# Patient Record
Sex: FEMALE | Race: AMERICAN INDIAN OR ALASKA NATIVE
[De-identification: names, ages, dates, MRNs, and addresses within clinical notes are randomized per-mention and may not be internally consistent; named-entity substitution may affect disease eponyms.]

---

## 2018-04-13 ENCOUNTER — HOSPITAL ENCOUNTER (INPATIENT)
Dept: HOSPITAL 5 - ED | Age: 48
LOS: 4 days | Discharge: HOME | DRG: 557 | End: 2018-04-17
Attending: INTERNAL MEDICINE | Admitting: INTERNAL MEDICINE
Payer: SELF-PAY

## 2018-04-13 DIAGNOSIS — F29: ICD-10-CM

## 2018-04-13 DIAGNOSIS — R41.82: ICD-10-CM

## 2018-04-13 DIAGNOSIS — E16.2: ICD-10-CM

## 2018-04-13 DIAGNOSIS — M62.82: Primary | ICD-10-CM

## 2018-04-13 DIAGNOSIS — G93.40: ICD-10-CM

## 2018-04-13 DIAGNOSIS — F20.9: ICD-10-CM

## 2018-04-13 LAB
ALBUMIN SERPL-MCNC: 4.2 G/DL (ref 3.9–5)
ALT SERPL-CCNC: 19 UNITS/L (ref 7–56)
APTT BLD: 33.2 SEC. (ref 24.2–36.6)
BASOPHILS # (AUTO): 0 K/MM3 (ref 0–0.1)
BASOPHILS NFR BLD AUTO: 0.6 % (ref 0–1.8)
BENZODIAZEPINES SCREEN,URINE: (no result)
BILIRUB UR QL STRIP: (no result)
BLOOD UR QL VISUAL: (no result)
BUN SERPL-MCNC: 7 MG/DL (ref 7–17)
BUN/CREAT SERPL: 18 %
CALCIUM SERPL-MCNC: 9.9 MG/DL (ref 8.4–10.2)
EOSINOPHIL # BLD AUTO: 0.2 K/MM3 (ref 0–0.4)
EOSINOPHIL NFR BLD AUTO: 3 % (ref 0–4.3)
HCT VFR BLD CALC: 41.1 % (ref 30.3–42.9)
HEMOLYSIS INDEX: 22
HGB BLD-MCNC: 13.7 GM/DL (ref 10.1–14.3)
INR PPP: 0.96 (ref 0.87–1.13)
LYMPHOCYTES # BLD AUTO: 1.7 K/MM3 (ref 1.2–5.4)
LYMPHOCYTES NFR BLD AUTO: 25.7 % (ref 13.4–35)
MCH RBC QN AUTO: 32 PG (ref 28–32)
MCHC RBC AUTO-ENTMCNC: 33 % (ref 30–34)
MCV RBC AUTO: 97 FL (ref 79–97)
METHADONE SCREEN,URINE: (no result)
MONOCYTES # (AUTO): 0.5 K/MM3 (ref 0–0.8)
MONOCYTES % (AUTO): 8.2 % (ref 0–7.3)
MUCOUS THREADS #/AREA URNS HPF: (no result) /HPF
OPIATE SCREEN,URINE: (no result)
PH UR STRIP: 5 [PH] (ref 5–7)
PLATELET # BLD: 267 K/MM3 (ref 140–440)
PROT UR STRIP-MCNC: (no result) MG/DL
RBC # BLD AUTO: 4.25 M/MM3 (ref 3.65–5.03)
RBC #/AREA URNS HPF: 2 /HPF (ref 0–6)
T4 FREE SERPL-MCNC: 1.15 NG/DL (ref 0.76–1.46)
UROBILINOGEN UR-MCNC: < 2 MG/DL (ref ?–2)
WBC #/AREA URNS HPF: 3 /HPF (ref 0–6)

## 2018-04-13 PROCEDURE — 82553 CREATINE MB FRACTION: CPT

## 2018-04-13 PROCEDURE — 96375 TX/PRO/DX INJ NEW DRUG ADDON: CPT

## 2018-04-13 PROCEDURE — 84439 ASSAY OF FREE THYROXINE: CPT

## 2018-04-13 PROCEDURE — 93005 ELECTROCARDIOGRAM TRACING: CPT

## 2018-04-13 PROCEDURE — 93010 ELECTROCARDIOGRAM REPORT: CPT

## 2018-04-13 PROCEDURE — 96372 THER/PROPH/DIAG INJ SC/IM: CPT

## 2018-04-13 PROCEDURE — 81001 URINALYSIS AUTO W/SCOPE: CPT

## 2018-04-13 PROCEDURE — G0480 DRUG TEST DEF 1-7 CLASSES: HCPCS

## 2018-04-13 PROCEDURE — 80320 DRUG SCREEN QUANTALCOHOLS: CPT

## 2018-04-13 PROCEDURE — 83036 HEMOGLOBIN GLYCOSYLATED A1C: CPT

## 2018-04-13 PROCEDURE — 70450 CT HEAD/BRAIN W/O DYE: CPT

## 2018-04-13 PROCEDURE — 80307 DRUG TEST PRSMV CHEM ANLYZR: CPT

## 2018-04-13 PROCEDURE — 84443 ASSAY THYROID STIM HORMONE: CPT

## 2018-04-13 PROCEDURE — 85730 THROMBOPLASTIN TIME PARTIAL: CPT

## 2018-04-13 PROCEDURE — 82140 ASSAY OF AMMONIA: CPT

## 2018-04-13 PROCEDURE — 85025 COMPLETE CBC W/AUTO DIFF WBC: CPT

## 2018-04-13 PROCEDURE — 96374 THER/PROPH/DIAG INJ IV PUSH: CPT

## 2018-04-13 PROCEDURE — 83880 ASSAY OF NATRIURETIC PEPTIDE: CPT

## 2018-04-13 PROCEDURE — 84484 ASSAY OF TROPONIN QUANT: CPT

## 2018-04-13 PROCEDURE — 85610 PROTHROMBIN TIME: CPT

## 2018-04-13 PROCEDURE — 80053 COMPREHEN METABOLIC PANEL: CPT

## 2018-04-13 PROCEDURE — 82550 ASSAY OF CK (CPK): CPT

## 2018-04-13 PROCEDURE — 36415 COLL VENOUS BLD VENIPUNCTURE: CPT

## 2018-04-13 RX ADMIN — FAMOTIDINE SCH MG: 10 INJECTION, SOLUTION INTRAVENOUS at 23:34

## 2018-04-13 NOTE — CAT SCAN REPORT
CT HEAD WITHOUT CONTRAST



INDICATION: Altered mental status.



COMPARISON: None similar at this institution.



FINDINGS: Noncontrast head CT demonstrates normal, symmetric ventricles 

and sulci without acute or recent infarct, hemorrhage, mass effect or 

midline shift. No abnormal extra-axial fluid collections. Posterior 

fossa structures and basilar cisterns appear within normal limits.



Symmetric eye globes. Clear paranasal sinuses and mastoid air cells. 

Intact calvarium. Normal overlying scalp soft tissues. Few radiopaque 

dental material incidentally noted.



CONCLUSION: No acute intracranial CT abnormality, as described.



Thank you for the opportunity to participate in this patient's care.

## 2018-04-13 NOTE — EMERGENCY DEPARTMENT REPORT
HPI





- General


Chief Complaint: Altered Mental Status


Time Seen by Provider: 18 10:18





- HPI


HPI: 





Room 24





The patient is a 48-year-old female brought in by EMS with chief complaint of 

altered mental status.  Nursing reports EMS was called to the [facility] for 

altered mental status.  There is no EMS run sheet currently available for the 

patient reportedly had nonsensical speech.  The patient has nonsensical speech.

  ED does not respond appropriate questions.  When asked why she was brought to 

the emergency department the patient replied "I don't like meat."  And "I've 

been standing still for a million years."  Per EMS the patient was found to 

have an Accu-Chek in the 50s.  The patient was given juice to drink prior to my 

interview





Location: Mental state


Duration: Unknown


Quality: Altered


Severity: Moderate


Modifying factors: [see above]


Context: [see above]


Mode of transportation: [not driving]











ED Past Medical Hx





- Past Medical History


Additional medical history: Unknown





- Surgical History


Additional Surgical History: Unknown





- Family History


Family history: no significant





- Social History


Smoking Status: Unknown if ever smoked





ED Review of Systems


ROS: 


Stated complaint: AMS


Other details as noted in HPI





Comment: Unobtainable due to pts medical conditions





Physical Exam





- Physical Exam


Vital Signs: 


 Vital Signs











  18





  10:13


 


Temperature 97.6 F


 


Pulse Rate 94 H


 


Respiratory 18





Rate 


 


Blood Pressure 113/70


 


Blood Pressure 113/70





[Right] 


 


O2 Sat by Pulse 100





Oximetry 











Physical Exam: 





GENERAL: The patient is well-developed well-nourished female lying on stretcher 

not appearing to be in acute distress. []


HEENT: Normocephalic.  Atraumatic. 


NECK: Trachea midline


CHEST/LUNGS:There is no respiratory distress noted.


HEART/CARDIOVASCULAR: Regular.  There is no tachycardia.  There is no gallop 

rub or murmur.


ABDOMEN:  There is no abdominal distention.


SKIN: There is no rash.  There is no edema.  There is no diaphoresis.


NEURO: The patient is awake, alert, but does not appear oriented.  The patient 

is not cooperative. The patient has normal speech


MUSCULOSKELETAL:  There is no evidence of acute injury.





ED Course


 Vital Signs











  18





  10:13


 


Temperature 97.6 F


 


Pulse Rate 94 H


 


Respiratory 18





Rate 


 


Blood Pressure 113/70


 


Blood Pressure 113/70





[Right] 


 


O2 Sat by Pulse 100





Oximetry 














ED Medical Decision Making





- Lab Data


Result diagrams: 


 18 12:26





 18 12:26





 Laboratory Tests











  18





  12:26 12:26 12:26


 


WBC  6.6  


 


RBC  4.25  


 


Hgb  13.7  


 


Hct  41.1  


 


MCV  97  


 


MCH  32  


 


MCHC  33  


 


RDW  14.9  


 


Plt Count  267  


 


Lymph % (Auto)  25.7  


 


Mono % (Auto)  8.2 H  


 


Eos % (Auto)  3.0  


 


Baso % (Auto)  0.6  


 


Lymph #  1.7  


 


Mono #  0.5  


 


Eos #  0.2  


 


Baso #  0.0  


 


Seg Neutrophils %  62.5  


 


Seg Neutrophils #  4.1  


 


PT   13.3 


 


INR   0.96 


 


APTT   33.2 


 


Sodium    139


 


Potassium    3.8


 


Chloride    99.4


 


Carbon Dioxide    23


 


Anion Gap    20


 


BUN    7


 


Creatinine    0.4 L


 


Estimated GFR    > 60


 


BUN/Creatinine Ratio    18


 


Glucose    58 L


 


Calcium    9.9


 


Total Bilirubin    0.40


 


AST    48 H


 


ALT    19


 


Alkaline Phosphatase    66


 


Ammonia   


 


Total Creatine Kinase    2482 H


 


CK-MB (CK-2)    23.5 H


 


CK-MB (CK-2) Rel Index    0.9


 


Troponin T    < 0.010


 


NT-Pro-B Natriuret Pep    31.77


 


Total Protein    7.7


 


Albumin    4.2


 


Albumin/Globulin Ratio    1.2


 


TSH   


 


Free T4   


 


Urine Color   


 


Urine Turbidity   


 


Urine pH   


 


Ur Specific Gravity   


 


Urine Protein   


 


Urine Glucose (UA)   


 


Urine Ketones   


 


Urine Blood   


 


Urine Nitrite   


 


Urine Bilirubin   


 


Urine Urobilinogen   


 


Ur Leukocyte Esterase   


 


Urine WBC (Auto)   


 


Urine RBC (Auto)   


 


Uric Acid Crystals   


 


Urine Mucus   


 


Urine Opiates Screen   


 


Urine Methadone Screen   


 


Ur Barbiturates Screen   


 


Ur Phencyclidine Scrn   


 


Ur Amphetamines Screen   


 


U Benzodiazepines Scrn   


 


Urine Cocaine Screen   


 


U Marijuana (THC) Screen   


 


Drugs of Abuse Note   


 


Plasma/Serum Alcohol   














  18





  12:26 12:26 12:26


 


WBC   


 


RBC   


 


Hgb   


 


Hct   


 


MCV   


 


MCH   


 


MCHC   


 


RDW   


 


Plt Count   


 


Lymph % (Auto)   


 


Mono % (Auto)   


 


Eos % (Auto)   


 


Baso % (Auto)   


 


Lymph #   


 


Mono #   


 


Eos #   


 


Baso #   


 


Seg Neutrophils %   


 


Seg Neutrophils #   


 


PT   


 


INR   


 


APTT   


 


Sodium   


 


Potassium   


 


Chloride   


 


Carbon Dioxide   


 


Anion Gap   


 


BUN   


 


Creatinine   


 


Estimated GFR   


 


BUN/Creatinine Ratio   


 


Glucose   


 


Calcium   


 


Total Bilirubin   


 


AST   


 


ALT   


 


Alkaline Phosphatase   


 


Ammonia  48.0  


 


Total Creatine Kinase   


 


CK-MB (CK-2)   


 


CK-MB (CK-2) Rel Index   


 


Troponin T   


 


NT-Pro-B Natriuret Pep   


 


Total Protein   


 


Albumin   


 


Albumin/Globulin Ratio   


 


TSH    0.700


 


Free T4    1.15


 


Urine Color   


 


Urine Turbidity   


 


Urine pH   


 


Ur Specific Gravity   


 


Urine Protein   


 


Urine Glucose (UA)   


 


Urine Ketones   


 


Urine Blood   


 


Urine Nitrite   


 


Urine Bilirubin   


 


Urine Urobilinogen   


 


Ur Leukocyte Esterase   


 


Urine WBC (Auto)   


 


Urine RBC (Auto)   


 


Uric Acid Crystals   


 


Urine Mucus   


 


Urine Opiates Screen   


 


Urine Methadone Screen   


 


Ur Barbiturates Screen   


 


Ur Phencyclidine Scrn   


 


Ur Amphetamines Screen   


 


U Benzodiazepines Scrn   


 


Urine Cocaine Screen   


 


U Marijuana (THC) Screen   


 


Drugs of Abuse Note   


 


Plasma/Serum Alcohol   < 0.01 














  18





  Unknown Unknown


 


WBC  


 


RBC  


 


Hgb  


 


Hct  


 


MCV  


 


MCH  


 


MCHC  


 


RDW  


 


Plt Count  


 


Lymph % (Auto)  


 


Mono % (Auto)  


 


Eos % (Auto)  


 


Baso % (Auto)  


 


Lymph #  


 


Mono #  


 


Eos #  


 


Baso #  


 


Seg Neutrophils %  


 


Seg Neutrophils #  


 


PT  


 


INR  


 


APTT  


 


Sodium  


 


Potassium  


 


Chloride  


 


Carbon Dioxide  


 


Anion Gap  


 


BUN  


 


Creatinine  


 


Estimated GFR  


 


BUN/Creatinine Ratio  


 


Glucose  


 


Calcium  


 


Total Bilirubin  


 


AST  


 


ALT  


 


Alkaline Phosphatase  


 


Ammonia  


 


Total Creatine Kinase  


 


CK-MB (CK-2)  


 


CK-MB (CK-2) Rel Index  


 


Troponin T  


 


NT-Pro-B Natriuret Pep  


 


Total Protein  


 


Albumin  


 


Albumin/Globulin Ratio  


 


TSH  


 


Free T4  


 


Urine Color  Yellow 


 


Urine Turbidity  Clear 


 


Urine pH  5.0 


 


Ur Specific Gravity  1.010 


 


Urine Protein  <15 mg/dl 


 


Urine Glucose (UA)  Neg 


 


Urine Ketones  Neg 


 


Urine Blood  Sm 


 


Urine Nitrite  Neg 


 


Urine Bilirubin  Neg 


 


Urine Urobilinogen  < 2.0 


 


Ur Leukocyte Esterase  Neg 


 


Urine WBC (Auto)  3.0 


 


Urine RBC (Auto)  2.0 


 


Uric Acid Crystals  1+ 


 


Urine Mucus  Few 


 


Urine Opiates Screen   Presumptive negative


 


Urine Methadone Screen   Presumptive negative


 


Ur Barbiturates Screen   Presumptive negative


 


Ur Phencyclidine Scrn   Presumptive negative


 


Ur Amphetamines Screen   Presumptive negative


 


U Benzodiazepines Scrn   Presumptive negative


 


Urine Cocaine Screen   Presumptive negative


 


U Marijuana (THC) Screen   Presumptive negative


 


Drugs of Abuse Note   Disclamer


 


Plasma/Serum Alcohol  














- EKG Data


-: EKG Interpreted by Me


EKG shows normal: sinus rhythm


Rate: normal





- EKG Data


When compared to previous EKG there are: previous EKG unavailable


Interpretation: nonspecific ST-T wave melceio (flattened T waves diffusely)





- Radiology Data


Radiology results: report reviewed (CT head), image reviewed (CT head)





[facility] 


[address] [place] [address] 





Cat Scan Report 


Signed 





Patient: SELMA URRUTIA MR#: [id number] 


: 1970 Acct:[id number] 


Age/Sex: 48 / F ADM Date: 18 


Loc: ED 


Attending Dr: 








Ordering Physician: TOO MACEDO MD 


Date of Service: 18 


Procedure(s): CT head/brain wo con 


Accession Number(s): [id number] 





cc: TOO MACEDO MD 








CT HEAD WITHOUT CONTRAST 





INDICATION: Altered mental status. 





COMPARISON: None similar at this institution. 





FINDINGS: Noncontrast head CT demonstrates normal, symmetric ventricles 


and sulci without acute or recent infarct, hemorrhage, mass effect or 


midline shift. No abnormal extra-axial fluid collections. Posterior 


fossa structures and basilar cisterns appear within normal limits. 





Symmetric eye globes. Clear paranasal sinuses and mastoid air cells. 


Intact calvarium. Normal overlying scalp soft tissues. Few radiopaque 


dental material incidentally noted. 





CONCLUSION: No acute intracranial CT abnormality, as described. 





Thank you for the opportunity to participate in this patient's care. 





Transcribed By: RS 


Dictated By: GAL STEVE MD 


Electronically Authenticated By: GAL STEVE MD 


Signed Date/Time: 18 113 











DD/DT: 18 113 


TD/TT: 18 113





- Differential Diagnosis


psychosis, CVA, ICH, altered mental status, substance abuse


Critical care attestation.: 


If time is entered above; I have spent that time in minutes in the direct care 

of this critically ill patient, excluding procedure time.








ED Disposition


Clinical Impression: 


 Altered mental status, Rhabdomyolysis





Disposition:  OP ADMIT IP TO THIS HOSP


Is pt being admited?: Yes


Does the pt Need Aspirin: Yes


Condition: Fair


Referrals: 


SARAH HOFFMAN MD [Primary Care Provider] - 3-5 Days


Time of Disposition: 13:35 (hospitalist notified (Dr Kirkland))

## 2018-04-13 NOTE — HISTORY AND PHYSICAL REPORT
History of Present Illness


Date of examination: 04/13/18


Date of admission: 


04/13/18 13:48





Chief complaint: 


 Chief complaint: 


Altered sensorium-acute onset-duration not known





History of present illness: 


JENNIFER:


48-year-old -American female brought in by EMS from [facility] for 

severely altered mental status.  Patient reportedly had nonsensical speech.  In 

the emergency room patient was not responding to any questions properly.  When 

asked why she was brought to the emergency room the patient replied "I don't 

like meat".  " And I have been standing still for 1 million years".  Patient 

apparently had a Accu-Cheks in the range of 50s.  Patient was given apple 

juice.  Acute onset of confusion

















-Past Medical History


Additional medical history: Unknown





- Surgical History


Additional Surgical History: Unknown





- Family History


Family history: no significant





- Social History


Smoking Status: Unknown if ever smoked





Review of Systems


ROS: 


Stated complaint: AMS


Other details as noted in HPI





Comment: Unobtainable due to pts medical conditions


14 point review of systems was attempted but could not be done because of the 

patient's condition








Medications and Allergies


 Allergies











Allergy/AdvReac Type Severity Reaction Status Date / Time


 


No Known Allergies Allergy   Unverified 04/13/18 10:23











Active Meds: 


Active Medications





Diphenhydramine HCl (Benadryl)  50 mg IM Q6H PRN


   PRN Reason: Agitation


   Last Admin: 04/13/18 11:02 Dose:  50 mg


Haloperidol Lactate (Haldol)  10 mg IM Q8H PRN


   PRN Reason: Agitation


   Last Admin: 04/13/18 11:02 Dose:  10 mg


Lorazepam (Ativan)  2 mg IM Q8H PRN


   PRN Reason: Agitation


   Last Admin: 04/13/18 11:02 Dose:  2 mg


Lorazepam (Ativan)  1 mg IV ONCE PRN


   PRN Reason: Agitation


   Last Admin: 04/13/18 12:00 Dose:  1 mg











Exam





- Physical Exam


Narrative exam: 


Patient lying in bed confused and lethargic








- Constitutional


Vitals: 


 











Temp Pulse Resp BP Pulse Ox


 


 97.6 F   86   16   108/64   100 


 


 04/13/18 10:13  04/13/18 15:53  04/13/18 15:53  04/13/18 15:53  04/13/18 15:53











General appearance: Present: no acute distress, well-nourished





- EENT


Eyes: Present: PERRL


ENT: hearing intact, clear oral mucosa





- Neck


Neck: Present: supple, normal ROM





- Respiratory


Respiratory effort: normal


Respiratory: bilateral: CTA





- Cardiovascular


Heart rate: 80


Rhythm: regular (80)


Heart Sounds: Present: S1 & S2.  Absent: rub, click





- Extremities


Extremities: no ischemia, pulses intact, pulses symmetrical, No edema


Peripheral Pulses: within normal limits





- Abdominal


General gastrointestinal: Present: soft, non-tender, non-distended, normal 

bowel sounds


Female genitourinary: Present: normal





- Rectal


Rectal Exam: deferred





- Integumentary


Integumentary: Present: clear, warm, dry





- Musculoskeletal


Musculoskeletal: gait normal, strength equal bilaterally





- Psychiatric


Psychiatric: depressed, other (lethargic and confused and psychotic)





- Neurologic


Neurologic: CNII-XII intact, moves all extremities





- Allied Health


Allied health notes reviewed: nursing, case management





Results





- Labs


CBC & Chem 7: 


 04/13/18 12:26





 04/13/18 12:26


Labs: 


 Laboratory Last Values











WBC  6.6 K/mm3 (4.5-11.0)   04/13/18  12:26    


 


RBC  4.25 M/mm3 (3.65-5.03)   04/13/18  12:26    


 


Hgb  13.7 gm/dl (10.1-14.3)   04/13/18  12:26    


 


Hct  41.1 % (30.3-42.9)   04/13/18  12:26    


 


MCV  97 fl (79-97)   04/13/18  12:26    


 


MCH  32 pg (28-32)   04/13/18  12:26    


 


MCHC  33 % (30-34)   04/13/18  12:26    


 


RDW  14.9 % (13.2-15.2)   04/13/18  12:26    


 


Plt Count  267 K/mm3 (140-440)   04/13/18  12:26    


 


Lymph % (Auto)  25.7 % (13.4-35.0)   04/13/18  12:26    


 


Mono % (Auto)  8.2 % (0.0-7.3)  H  04/13/18  12:26    


 


Eos % (Auto)  3.0 % (0.0-4.3)   04/13/18  12:26    


 


Baso % (Auto)  0.6 % (0.0-1.8)   04/13/18  12:26    


 


Lymph #  1.7 K/mm3 (1.2-5.4)   04/13/18  12:26    


 


Mono #  0.5 K/mm3 (0.0-0.8)   04/13/18  12:26    


 


Eos #  0.2 K/mm3 (0.0-0.4)   04/13/18  12:26    


 


Baso #  0.0 K/mm3 (0.0-0.1)   04/13/18  12:26    


 


Seg Neutrophils %  62.5 % (40.0-70.0)   04/13/18  12:26    


 


Seg Neutrophils #  4.1 K/mm3 (1.8-7.7)   04/13/18  12:26    


 


PT  13.3 Sec. (12.2-14.9)   04/13/18  12:26    


 


INR  0.96  (0.87-1.13)   04/13/18  12:26    


 


APTT  33.2 Sec. (24.2-36.6)   04/13/18  12:26    


 


Sodium  139 mmol/L (137-145)   04/13/18  12:26    


 


Potassium  3.8 mmol/L (3.6-5.0)   04/13/18  12:26    


 


Chloride  99.4 mmol/L ()   04/13/18  12:26    


 


Carbon Dioxide  23 mmol/L (22-30)   04/13/18  12:26    


 


Anion Gap  20 mmol/L  04/13/18  12:26    


 


BUN  7 mg/dL (7-17)   04/13/18  12:26    


 


Creatinine  0.4 mg/dL (0.7-1.2)  L  04/13/18  12:26    


 


Estimated GFR  > 60 ml/min  04/13/18  12:26    


 


BUN/Creatinine Ratio  18 %  04/13/18  12:26    


 


Glucose  58 mg/dL ()  L  04/13/18  12:26    


 


Calcium  9.9 mg/dL (8.4-10.2)   04/13/18  12:26    


 


Total Bilirubin  0.40 mg/dL (0.1-1.2)   04/13/18  12:26    


 


AST  48 units/L (5-40)  H  04/13/18  12:26    


 


ALT  19 units/L (7-56)   04/13/18  12:26    


 


Alkaline Phosphatase  66 units/L ()   04/13/18  12:26    


 


Ammonia  48.0 umol/L (25-60)   04/13/18  12:26    


 


Total Creatine Kinase  2482 units/L ()  H  04/13/18  12:26    


 


CK-MB (CK-2)  23.5 ng/mL (0.0-4.0)  H  04/13/18  12:26    


 


CK-MB (CK-2) Rel Index  0.9  (0-4)   04/13/18  12:26    


 


Troponin T  < 0.010 ng/mL (0.00-0.029)   04/13/18  12:26    


 


NT-Pro-B Natriuret Pep  31.77 pg/mL (0-900)   04/13/18  12:26    


 


Total Protein  7.7 g/dL (6.3-8.2)   04/13/18  12:26    


 


Albumin  4.2 g/dL (3.9-5)   04/13/18  12:26    


 


Albumin/Globulin Ratio  1.2 %  04/13/18  12:26    


 


TSH  0.700 mlU/mL (0.270-4.200)   04/13/18  12:26    


 


Free T4  1.15 ng/dL (0.76-1.46)   04/13/18  12:26    


 


Urine Color  Yellow  (Yellow)   04/13/18  Unknown


 


Urine Turbidity  Clear  (Clear)   04/13/18  Unknown


 


Urine pH  5.0  (5.0-7.0)   04/13/18  Unknown


 


Ur Specific Gravity  1.010  (1.003-1.030)   04/13/18  Unknown


 


Urine Protein  <15 mg/dl mg/dL (Negative)   04/13/18  Unknown


 


Urine Glucose (UA)  Neg mg/dL (Negative)   04/13/18  Unknown


 


Urine Ketones  Neg mg/dL (Negative)   04/13/18  Unknown


 


Urine Blood  Sm  (Negative)   04/13/18  Unknown


 


Urine Nitrite  Neg  (Negative)   04/13/18  Unknown


 


Urine Bilirubin  Neg  (Negative)   04/13/18  Unknown


 


Urine Urobilinogen  < 2.0 mg/dL (<2.0)   04/13/18  Unknown


 


Ur Leukocyte Esterase  Neg  (Negative)   04/13/18  Unknown


 


Urine WBC (Auto)  3.0 /HPF (0.0-6.0)   04/13/18  Unknown


 


Urine RBC (Auto)  2.0 /HPF (0.0-6.0)   04/13/18  Unknown


 


Uric Acid Crystals  1+   04/13/18  Unknown


 


Urine Mucus  Few /HPF  04/13/18  Unknown


 


Urine Opiates Screen  Presumptive negative   04/13/18  Unknown


 


Urine Methadone Screen  Presumptive negative   04/13/18  Unknown


 


Ur Barbiturates Screen  Presumptive negative   04/13/18  Unknown


 


Ur Phencyclidine Scrn  Presumptive negative   04/13/18  Unknown


 


Ur Amphetamines Screen  Presumptive negative   04/13/18  Unknown


 


U Benzodiazepines Scrn  Presumptive negative   04/13/18  Unknown


 


Urine Cocaine Screen  Presumptive negative   04/13/18  Unknown


 


U Marijuana (THC) Screen  Presumptive negative   04/13/18  Unknown


 


Drugs of Abuse Note  Disclamer   04/13/18  Unknown


 


Plasma/Serum Alcohol  < 0.01 % (0-0.07)   04/13/18  12:26    








 Short CBC











  04/13/18 Range/Units





  12:26 


 


WBC  6.6  (4.5-11.0)  K/mm3


 


Hgb  13.7  (10.1-14.3)  gm/dl


 


Hct  41.1  (30.3-42.9)  %


 


Plt Count  267  (140-440)  K/mm3








 BMP











  04/13/18





  12:26


 


Sodium  139


 


Potassium  3.8


 


Chloride  99.4


 


Carbon Dioxide  23


 


BUN  7


 


Creatinine  0.4 L


 


Glucose  58 L


 


Calcium  9.9








 Cardiac Enzymes











  04/13/18 Range/Units





  12:26 


 


Total Creatine Kinase  2482 H  ()  units/L


 


CK-MB (CK-2)  23.5 H  (0.0-4.0)  ng/mL


 


Troponin T  < 0.010  (0.00-0.029)  ng/mL








 Liver Function











  04/13/18 Range/Units





  12:26 


 


Total Bilirubin  0.40  (0.1-1.2)  mg/dL


 


AST  48 H  (5-40)  units/L


 


ALT  19  (7-56)  units/L


 


Alkaline Phosphatase  66  ()  units/L


 


Albumin  4.2  (3.9-5)  g/dL








 Urine











  04/13/18 Range/Units





  Unknown 


 


Urine Color  Yellow  (Yellow)  


 


Urine pH  5.0  (5.0-7.0)  


 


Ur Specific Gravity  1.010  (1.003-1.030)  


 


Urine Protein  <15 mg/dl  (Negative)  mg/dL


 


Urine Glucose (UA)  Neg  (Negative)  mg/dL














- Imaging and Cardiology


EKG: report reviewed (sinus rhythm heart rate of 82/m no acute ST-T wave 

changes EKG interpreted by me)


CT Scan - head: report reviewed (no acute findings)





Assessment and Plan


Advance Directives: Yes (full code)


VTE prophylaxis?: Chemical


Plan of care discussed with patient/family: Yes





- Patient Problems


(1) Rhabdomyolysis


Current Visit: Yes   Status: Acute   


Qualifiers: 


   Rhabdomyolysis type: non-traumatic   Qualified Code(s): M62.82 - 

Rhabdomyolysis   


Plan to address problem: 


Patient has a creatinine kinase  of 2482.  IV fluids for now.  Etiology 

unclear.  Probably secondary to muscle spasms.








(2) Altered mental status


Current Visit: Yes   Status: Acute   


Qualifiers: 


   Altered mental status type: delirium   Qualified Code(s): R41.0 - 

Disorientation, unspecified   


Plan to address problem: 


Patient with acute psychosis.  Patient initiated on IV Rocephin for possible 

meningitis.  We will get an LP done.  Rocephin should cover meningitis 

infection even though it is unlikely.








(3) Encephalopathy acute


Current Visit: Yes   Status: Acute   


Plan to address problem: 


Etiology unclear


IV fluids for now


Hypoglycemia to be treated adequately








(4) Acute psychosis


Current Visit: Yes   Status: Acute   


Plan to address problem: 


Mental health consult requested


Haloperidol initiated








(5) DVT prophylaxis


Current Visit: Yes   Status: Acute   


Plan to address problem: 


Heparin subcutaneously


GI prophylaxis initiated

## 2018-04-14 LAB
ALBUMIN SERPL-MCNC: 3.4 G/DL (ref 3.9–5)
ALT SERPL-CCNC: 15 UNITS/L (ref 7–56)
BASOPHILS # (AUTO): 0.1 K/MM3 (ref 0–0.1)
BASOPHILS NFR BLD AUTO: 1.2 % (ref 0–1.8)
BUN SERPL-MCNC: 7 MG/DL (ref 7–17)
BUN/CREAT SERPL: 14 %
CALCIUM SERPL-MCNC: 8.8 MG/DL (ref 8.4–10.2)
EOSINOPHIL # BLD AUTO: 0.2 K/MM3 (ref 0–0.4)
EOSINOPHIL NFR BLD AUTO: 4.4 % (ref 0–4.3)
HCT VFR BLD CALC: 38.9 % (ref 30.3–42.9)
HEMOLYSIS INDEX: 30
HGB BLD-MCNC: 13 GM/DL (ref 10.1–14.3)
LYMPHOCYTES # BLD AUTO: 1.8 K/MM3 (ref 1.2–5.4)
LYMPHOCYTES NFR BLD AUTO: 32 % (ref 13.4–35)
MCH RBC QN AUTO: 32 PG (ref 28–32)
MCHC RBC AUTO-ENTMCNC: 33 % (ref 30–34)
MCV RBC AUTO: 96 FL (ref 79–97)
MONOCYTES # (AUTO): 0.4 K/MM3 (ref 0–0.8)
MONOCYTES % (AUTO): 7 % (ref 0–7.3)
PLATELET # BLD: 261 K/MM3 (ref 140–440)
RBC # BLD AUTO: 4.04 M/MM3 (ref 3.65–5.03)

## 2018-04-14 RX ADMIN — TRAZODONE HYDROCHLORIDE SCH MG: 100 TABLET ORAL at 22:25

## 2018-04-14 RX ADMIN — FAMOTIDINE SCH MG: 20 TABLET ORAL at 23:34

## 2018-04-14 RX ADMIN — ENOXAPARIN SODIUM SCH MG: 100 INJECTION SUBCUTANEOUS at 22:25

## 2018-04-14 RX ADMIN — Medication SCH ML: at 09:14

## 2018-04-14 RX ADMIN — FAMOTIDINE SCH: 10 INJECTION, SOLUTION INTRAVENOUS at 22:00

## 2018-04-14 RX ADMIN — CEFTRIAXONE SODIUM SCH: 10 INJECTION, POWDER, FOR SOLUTION INTRAVENOUS at 17:45

## 2018-04-14 RX ADMIN — Medication SCH: at 23:34

## 2018-04-14 RX ADMIN — FAMOTIDINE SCH MG: 10 INJECTION, SOLUTION INTRAVENOUS at 09:10

## 2018-04-14 NOTE — PROGRESS NOTE
Assessment and Plan


Assessment and plan: 


Acute psychosis in patient with known schizophrenia. 


Will obtain medication list and resume her meds


Psych consulted





Rhabdomyolysis. Continue iv fluids.


Check Creatine kinase daily





DVT prophylaxis with Lovenox





Full code status








History


Interval history: 


Patient with history of schizophrenia,


confused,disoriented,


no fever








Hospitalist Physical





- Physical exam


Narrative exam: 


Gen appearance: Not in acute distress, sitting up in chair, 


HEENT:Normocephalic, atraumatic


Neck:supple, no JVD


Lungs: Clear to auscultation bilaterally, no crackles , no wheeze


Heart: S1 and S2 regular, no murmurs, rubs or gallop


Abdomen: soft, non tender, non distended, normal bowel sounds


Ext: No edema, no clubbing, no cyanosis.


Neuro: Awake, alert, oriented to person, but not to place or time, Moves all ext

, no focal signs


Psych:has schizophrenia














- Constitutional


Vitals: 


 











Temp Pulse Resp BP Pulse Ox


 


 97.6 F   89   18   101/61   100 


 


 04/13/18 10:13  04/13/18 23:52  04/13/18 23:52  04/13/18 23:52  04/13/18 23:52











General appearance: Present: no acute distress, well-nourished





Results





- Labs


CBC & Chem 7: 


 04/14/18 08:08





 04/14/18 08:08


Labs: 


 Laboratory Last Values











WBC  5.5 K/mm3 (4.5-11.0)   04/14/18  08:08    


 


RBC  4.04 M/mm3 (3.65-5.03)   04/14/18  08:08    


 


Hgb  13.0 gm/dl (10.1-14.3)   04/14/18  08:08    


 


Hct  38.9 % (30.3-42.9)   04/14/18  08:08    


 


MCV  96 fl (79-97)   04/14/18  08:08    


 


MCH  32 pg (28-32)   04/14/18  08:08    


 


MCHC  33 % (30-34)   04/14/18  08:08    


 


RDW  15.3 % (13.2-15.2)  H  04/14/18  08:08    


 


Plt Count  261 K/mm3 (140-440)   04/14/18  08:08    


 


Lymph % (Auto)  32.0 % (13.4-35.0)   04/14/18  08:08    


 


Mono % (Auto)  7.0 % (0.0-7.3)   04/14/18  08:08    


 


Eos % (Auto)  4.4 % (0.0-4.3)  H  04/14/18  08:08    


 


Baso % (Auto)  1.2 % (0.0-1.8)   04/14/18  08:08    


 


Lymph #  1.8 K/mm3 (1.2-5.4)   04/14/18  08:08    


 


Mono #  0.4 K/mm3 (0.0-0.8)   04/14/18  08:08    


 


Eos #  0.2 K/mm3 (0.0-0.4)   04/14/18  08:08    


 


Baso #  0.1 K/mm3 (0.0-0.1)   04/14/18  08:08    


 


Seg Neutrophils %  55.4 % (40.0-70.0)   04/14/18  08:08    


 


Seg Neutrophils #  3.1 K/mm3 (1.8-7.7)   04/14/18  08:08    


 


PT  13.3 Sec. (12.2-14.9)   04/13/18  12:26    


 


INR  0.96  (0.87-1.13)   04/13/18  12:26    


 


APTT  33.2 Sec. (24.2-36.6)   04/13/18  12:26    


 


Sodium  137 mmol/L (137-145)   04/14/18  08:08    


 


Potassium  3.8 mmol/L (3.6-5.0)   04/14/18  08:08    


 


Chloride  101.6 mmol/L ()   04/14/18  08:08    


 


Carbon Dioxide  24 mmol/L (22-30)   04/14/18  08:08    


 


Anion Gap  15 mmol/L  04/14/18  08:08    


 


BUN  7 mg/dL (7-17)   04/14/18  08:08    


 


Creatinine  0.5 mg/dL (0.7-1.2)  L  04/14/18  08:08    


 


Estimated GFR  > 60 ml/min  04/14/18  08:08    


 


BUN/Creatinine Ratio  14 %  04/14/18  08:08    


 


Glucose  99 mg/dL ()   04/14/18  08:08    


 


Calcium  8.8 mg/dL (8.4-10.2)   04/14/18  08:08    


 


Total Bilirubin  0.40 mg/dL (0.1-1.2)   04/14/18  08:08    


 


AST  36 units/L (5-40)   04/14/18  08:08    


 


ALT  15 units/L (7-56)   04/14/18  08:08    


 


Alkaline Phosphatase  57 units/L ()   04/14/18  08:08    


 


Ammonia  48.0 umol/L (25-60)   04/13/18  12:26    


 


Total Creatine Kinase  2482 units/L ()  H  04/13/18  12:26    


 


CK-MB (CK-2)  23.5 ng/mL (0.0-4.0)  H  04/13/18  12:26    


 


CK-MB (CK-2) Rel Index  0.9  (0-4)   04/13/18  12:26    


 


Troponin T  < 0.010 ng/mL (0.00-0.029)   04/13/18  12:26    


 


NT-Pro-B Natriuret Pep  31.77 pg/mL (0-900)   04/13/18  12:26    


 


Total Protein  6.4 g/dL (6.3-8.2)   04/14/18  08:08    


 


Albumin  3.4 g/dL (3.9-5)  L  04/14/18  08:08    


 


Albumin/Globulin Ratio  1.1 %  04/14/18  08:08    


 


TSH  0.700 mlU/mL (0.270-4.200)   04/13/18  12:26    


 


Free T4  1.15 ng/dL (0.76-1.46)   04/13/18  12:26    


 


Urine Color  Yellow  (Yellow)   04/13/18  Unknown


 


Urine Turbidity  Clear  (Clear)   04/13/18  Unknown


 


Urine pH  5.0  (5.0-7.0)   04/13/18  Unknown


 


Ur Specific Gravity  1.010  (1.003-1.030)   04/13/18  Unknown


 


Urine Protein  <15 mg/dl mg/dL (Negative)   04/13/18  Unknown


 


Urine Glucose (UA)  Neg mg/dL (Negative)   04/13/18  Unknown


 


Urine Ketones  Neg mg/dL (Negative)   04/13/18  Unknown


 


Urine Blood  Sm  (Negative)   04/13/18  Unknown


 


Urine Nitrite  Neg  (Negative)   04/13/18  Unknown


 


Urine Bilirubin  Neg  (Negative)   04/13/18  Unknown


 


Urine Urobilinogen  < 2.0 mg/dL (<2.0)   04/13/18  Unknown


 


Ur Leukocyte Esterase  Neg  (Negative)   04/13/18  Unknown


 


Urine WBC (Auto)  3.0 /HPF (0.0-6.0)   04/13/18  Unknown


 


Urine RBC (Auto)  2.0 /HPF (0.0-6.0)   04/13/18  Unknown


 


Uric Acid Crystals  1+   04/13/18  Unknown


 


Urine Mucus  Few /HPF  04/13/18  Unknown


 


Urine Opiates Screen  Presumptive negative   04/13/18  Unknown


 


Urine Methadone Screen  Presumptive negative   04/13/18  Unknown


 


Ur Barbiturates Screen  Presumptive negative   04/13/18  Unknown


 


Ur Phencyclidine Scrn  Presumptive negative   04/13/18  Unknown


 


Ur Amphetamines Screen  Presumptive negative   04/13/18  Unknown


 


U Benzodiazepines Scrn  Presumptive negative   04/13/18  Unknown


 


Urine Cocaine Screen  Presumptive negative   04/13/18  Unknown


 


U Marijuana (THC) Screen  Presumptive negative   04/13/18  Unknown


 


Drugs of Abuse Note  Disclamer   04/13/18  Unknown


 


Plasma/Serum Alcohol  < 0.01 % (0-0.07)   04/13/18  12:26

## 2018-04-15 RX ADMIN — CEFTRIAXONE SODIUM SCH: 10 INJECTION, POWDER, FOR SOLUTION INTRAVENOUS at 09:28

## 2018-04-15 RX ADMIN — TRAZODONE HYDROCHLORIDE SCH MG: 100 TABLET ORAL at 21:54

## 2018-04-15 RX ADMIN — FAMOTIDINE SCH MG: 20 TABLET ORAL at 21:54

## 2018-04-15 RX ADMIN — FAMOTIDINE SCH MG: 20 TABLET ORAL at 09:29

## 2018-04-15 RX ADMIN — ENOXAPARIN SODIUM SCH MG: 100 INJECTION SUBCUTANEOUS at 21:54

## 2018-04-15 RX ADMIN — Medication SCH: at 09:33

## 2018-04-15 RX ADMIN — Medication SCH ML: at 21:56

## 2018-04-15 NOTE — CONSULTATION
History of Present Illness





- Reason for Consult


Consult date: 04/15/18


Reason for consult: Initial Psychiatric Evaluation





- Chief Complaint


Chief complaint: 


 " I haven't been taking medication." 








- History of Present Psychiatric Illness


Daija is a 48-year-old -American female brought in by EMS from [facility] for altered mental status.  Patient reportedly had nonsensical speech.  

In the emergency room patient was not responding to any questions properly. She 

has a PPHx of Schizophrenia.  Today patient states " I haven't been taking my 

medication." Patient has been noncompliant with medication for approximately 3 

months. She verbalizes that she is easily angered. She reports good sleep ( 

with medication), good appetite, and decrease energy. She endorses visual 

hallucinations. She states " It's too bad. It's personal. I can't let you know 

what it is."  She later states " I can't tell you my business because you will 

play on my mind." She denies SI/HI and AH. 








Medications and Allergies


 Allergies











Allergy/AdvReac Type Severity Reaction Status Date / Time


 


No Known Allergies Allergy   Verified 04/13/18 23:11











 Home Medications











 Medication  Instructions  Recorded  Confirmed  Last Taken  Type


 


OLANZapine 20 mg PO DAILY 04/14/18 04/14/18 Unknown History


 


traZODone 100 mg PO HS 04/14/18 04/14/18 04/10/18 History











Active Meds: 


Active Medications





Acetaminophen (Tylenol)  650 mg PO Q4H PRN


   PRN Reason: Pain MILD(1-3)/Fever >100.5/HA


Al Hydrox/Mg Hydrox/Simethicone (Alum-Mag Hydrox-Simeth [id number]/5ml)  30 

ml PO Q4H PRN


   PRN Reason: Indigestion


Diphenhydramine HCl (Benadryl)  50 mg IM Q6H PRN


   PRN Reason: Agitation


   Last Admin: 04/13/18 11:02 Dose:  50 mg


Enoxaparin Sodium (Lovenox)  40 mg SUB-Q QDAY@2200 [facility]


   Last Admin: 04/14/18 22:25 Dose:  40 mg


Famotidine (Pepcid)  20 mg PO BID [facility]


   Last Admin: 04/15/18 09:29 Dose:  20 mg


Haloperidol Lactate (Haldol)  10 mg IM Q8H PRN


   PRN Reason: Agitation


   Last Admin: 04/13/18 11:02 Dose:  10 mg


Dextrose/Sodium Chloride (D5ns)  1,000 mls @ 125 mls/hr IV AS DIRECT [facility]


   Last Admin: 04/13/18 23:34 Dose:  125 mls/hr


Lorazepam (Ativan)  2 mg IM Q8H PRN


   PRN Reason: Agitation


   Last Admin: 04/13/18 11:02 Dose:  2 mg


Lorazepam (Ativan)  1 mg IV ONCE PRN


   PRN Reason: Agitation


   Last Admin: 04/13/18 12:00 Dose:  1 mg


Magnesium Hydroxide (Milk Of Magnesia)  30 ml PO Q4H PRN


   PRN Reason: Constipation


Metoclopramide HCl (Reglan)  10 mg IV Q6H PRN


   PRN Reason: Nausea And Vomiting


Morphine Sulfate (Morphine)  2 mg IV Q4H PRN


   PRN Reason: Pain, Moderate (4-6)


Olanzapine (Zyprexa)  20 mg PO DAILY [facility]


   Last Admin: 04/15/18 09:30 Dose:  20 mg


Ondansetron HCl (Zofran)  4 mg IV Q8H PRN


   PRN Reason: Nausea And Vomiting


Sodium Chloride (Sodium Chloride Flush Syringe 10 Ml)  10 ml IV BID [facility]


   Last Admin: 04/15/18 09:33 Dose:  Not Given


Sodium Chloride (Sodium Chloride Flush Syringe 10 Ml)  10 ml IV PRN PRN


   PRN Reason: LINE FLUSH


Trazodone HCl (Desyrel)  100 mg PO QHS [facility]


   Last Admin: 04/14/18 22:25 Dose:  100 mg











Mental Status Exam





- Vital signs


 Last Vital Signs











Temp  98.0 F   04/15/18 08:07


 


Pulse  104 H  04/14/18 22:30


 


Resp  20   04/15/18 08:07


 


BP  130/77   04/15/18 08:07


 


Pulse Ox  96   04/14/18 22:30














- Exam


Narrative exam: 


 Mental Status Exam


General Appearance: Causally Dressed-hospital gown


Eye Contact: Intermittent


Orientation: Alert and oriented x 3 ( person, place,  and situation)


Attitude/Behavior: Cooperative


Sensorium: Distracted


Psychomotor & Musculoskeletal Activity: Ambulatory and laying in bed


Mood: "Okay." 


Affect: Appropriate


Speech/Language: Normal rate and tone


Thought Processes: Circumstantial with loose associations 


Thought Content: Paranoid


Perception: + VH- " It's too bad. I can't tell you." 


Concentration/Attention:  Impaired 


Suicidal Ideation/Plan: Patient denies


Homicidal Ideation/Plan: Patient denies








Results


Result Diagrams: 


 04/14/18 08:08





 04/14/18 08:08


All other labs normal.








Assessment and Plan


Assessment and plan: 


Impression: Hx of Schizophrenia. Today the patient is calm and cooperative 

during the assessment. She endorses VH. Paranoid thoughts are evident. Loose 

associations are noted. CK level is elevated, however, trending downward (1280)

. 





DDx: Schizophrenia 





Recommendation/Plan: 


1. Continue Trazodone 50 mg PO HS (home medication), Haldol 5 mg Im Q6hrs PRN 

for acute agitation.


2. Monitor CK level.


3. Will consider discontinuing/holding antipsychotics if CK levels increase. 


4. Will attempt to contact sister, Arlen Gross  for collateral 

information.

## 2018-04-15 NOTE — PROGRESS NOTE
Assessment and Plan


Assessment and plan: 


Acute psychosis in patient with known schizophrenia. 


Continue Olanzapine and Trazodone he was using at home.


Psych consulted





Rhabdomyolysis. Continue iv fluids.


Check Creatine kinase daily. Labs for today pending





DVT prophylaxis with Lovenox





Full code status





Discussed with sister at bedside yesterday. Sister states Pt has schizophrenia 

and has been off medications for a while.








History


Interval history: 


Patient with history of schizophrenia,


still confused,disoriented,


no fever








Hospitalist Physical





- Physical exam


Narrative exam: 


Gen appearance: Not in acute distress, lying down in bed, 


HEENT:Normocephalic, atraumatic


Neck:supple, no JVD


Lungs: Clear to auscultation bilaterally, no crackles , no wheeze


Heart: S1 and S2 regular, no murmurs, rubs or gallop


Abdomen: soft, non tender, non distended, normal bowel sounds


Ext: No edema, no clubbing, no cyanosis.


Neuro: Awake, alert, oriented to person, but not to place or time, Moves all ext

, no focal signs


Psych:has schizophrenia














- Constitutional


Vitals: 


 











Temp Pulse Resp BP Pulse Ox


 


 98.0 F   104 H  20   130/77   96 


 


 04/15/18 08:07  04/14/18 22:30  04/15/18 08:07  04/15/18 08:07  04/14/18 22:30











General appearance: Present: no acute distress, well-nourished





Results





- Labs


CBC & Chem 7: 


 04/14/18 08:08





 04/14/18 08:08


Labs: 


 Laboratory Last Values











WBC  5.5 K/mm3 (4.5-11.0)   04/14/18  08:08    


 


RBC  4.04 M/mm3 (3.65-5.03)   04/14/18  08:08    


 


Hgb  13.0 gm/dl (10.1-14.3)   04/14/18  08:08    


 


Hct  38.9 % (30.3-42.9)   04/14/18  08:08    


 


MCV  96 fl (79-97)   04/14/18  08:08    


 


MCH  32 pg (28-32)   04/14/18  08:08    


 


MCHC  33 % (30-34)   04/14/18  08:08    


 


RDW  15.3 % (13.2-15.2)  H  04/14/18  08:08    


 


Plt Count  261 K/mm3 (140-440)   04/14/18  08:08    


 


Lymph % (Auto)  32.0 % (13.4-35.0)   04/14/18  08:08    


 


Mono % (Auto)  7.0 % (0.0-7.3)   04/14/18  08:08    


 


Eos % (Auto)  4.4 % (0.0-4.3)  H  04/14/18  08:08    


 


Baso % (Auto)  1.2 % (0.0-1.8)   04/14/18  08:08    


 


Lymph #  1.8 K/mm3 (1.2-5.4)   04/14/18  08:08    


 


Mono #  0.4 K/mm3 (0.0-0.8)   04/14/18  08:08    


 


Eos #  0.2 K/mm3 (0.0-0.4)   04/14/18  08:08    


 


Baso #  0.1 K/mm3 (0.0-0.1)   04/14/18  08:08    


 


Seg Neutrophils %  55.4 % (40.0-70.0)   04/14/18  08:08    


 


Seg Neutrophils #  3.1 K/mm3 (1.8-7.7)   04/14/18  08:08    


 


PT  13.3 Sec. (12.2-14.9)   04/13/18  12:26    


 


INR  0.96  (0.87-1.13)   04/13/18  12:26    


 


APTT  33.2 Sec. (24.2-36.6)   04/13/18  12:26    


 


Sodium  137 mmol/L (137-145)   04/14/18  08:08    


 


Potassium  3.8 mmol/L (3.6-5.0)   04/14/18  08:08    


 


Chloride  101.6 mmol/L ()   04/14/18  08:08    


 


Carbon Dioxide  24 mmol/L (22-30)   04/14/18  08:08    


 


Anion Gap  15 mmol/L  04/14/18  08:08    


 


BUN  7 mg/dL (7-17)   04/14/18  08:08    


 


Creatinine  0.5 mg/dL (0.7-1.2)  L  04/14/18  08:08    


 


Estimated GFR  > 60 ml/min  04/14/18  08:08    


 


BUN/Creatinine Ratio  14 %  04/14/18  08:08    


 


Glucose  99 mg/dL ()   04/14/18  08:08    


 


Hemoglobin A1c  4.9 % (4-6)   04/14/18  08:08    


 


Calcium  8.8 mg/dL (8.4-10.2)   04/14/18  08:08    


 


Total Bilirubin  0.40 mg/dL (0.1-1.2)   04/14/18  08:08    


 


AST  36 units/L (5-40)   04/14/18  08:08    


 


ALT  15 units/L (7-56)   04/14/18  08:08    


 


Alkaline Phosphatase  57 units/L ()   04/14/18  08:08    


 


Ammonia  48.0 umol/L (25-60)   04/13/18  12:26    


 


Total Creatine Kinase  1283 units/L ()  H  04/14/18  08:08    


 


CK-MB (CK-2)  23.5 ng/mL (0.0-4.0)  H  04/13/18  12:26    


 


CK-MB (CK-2) Rel Index  0.9  (0-4)   04/13/18  12:26    


 


Troponin T  < 0.010 ng/mL (0.00-0.029)   04/13/18  12:26    


 


NT-Pro-B Natriuret Pep  31.77 pg/mL (0-900)   04/13/18  12:26    


 


Total Protein  6.4 g/dL (6.3-8.2)   04/14/18  08:08    


 


Albumin  3.4 g/dL (3.9-5)  L  04/14/18  08:08    


 


Albumin/Globulin Ratio  1.1 %  04/14/18  08:08    


 


TSH  0.700 mlU/mL (0.270-4.200)   04/13/18  12:26    


 


Free T4  1.15 ng/dL (0.76-1.46)   04/13/18  12:26    


 


Urine Color  Yellow  (Yellow)   04/13/18  Unknown


 


Urine Turbidity  Clear  (Clear)   04/13/18  Unknown


 


Urine pH  5.0  (5.0-7.0)   04/13/18  Unknown


 


Ur Specific Gravity  1.010  (1.003-1.030)   04/13/18  Unknown


 


Urine Protein  <15 mg/dl mg/dL (Negative)   04/13/18  Unknown


 


Urine Glucose (UA)  Neg mg/dL (Negative)   04/13/18  Unknown


 


Urine Ketones  Neg mg/dL (Negative)   04/13/18  Unknown


 


Urine Blood  Sm  (Negative)   04/13/18  Unknown


 


Urine Nitrite  Neg  (Negative)   04/13/18  Unknown


 


Urine Bilirubin  Neg  (Negative)   04/13/18  Unknown


 


Urine Urobilinogen  < 2.0 mg/dL (<2.0)   04/13/18  Unknown


 


Ur Leukocyte Esterase  Neg  (Negative)   04/13/18  Unknown


 


Urine WBC (Auto)  3.0 /HPF (0.0-6.0)   04/13/18  Unknown


 


Urine RBC (Auto)  2.0 /HPF (0.0-6.0)   04/13/18  Unknown


 


Uric Acid Crystals  1+   04/13/18  Unknown


 


Urine Mucus  Few /HPF  04/13/18  Unknown


 


Urine Opiates Screen  Presumptive negative   04/13/18  Unknown


 


Urine Methadone Screen  Presumptive negative   04/13/18  Unknown


 


Ur Barbiturates Screen  Presumptive negative   04/13/18  Unknown


 


Ur Phencyclidine Scrn  Presumptive negative   04/13/18  Unknown


 


Ur Amphetamines Screen  Presumptive negative   04/13/18  Unknown


 


U Benzodiazepines Scrn  Presumptive negative   04/13/18  Unknown


 


Urine Cocaine Screen  Presumptive negative   04/13/18  Unknown


 


U Marijuana (THC) Screen  Presumptive negative   04/13/18  Unknown


 


Drugs of Abuse Note  Disclamer   04/13/18  Unknown


 


Plasma/Serum Alcohol  < 0.01 % (0-0.07)   04/13/18  12:26

## 2018-04-16 RX ADMIN — ENOXAPARIN SODIUM SCH: 100 INJECTION SUBCUTANEOUS at 21:36

## 2018-04-16 RX ADMIN — FAMOTIDINE SCH MG: 20 TABLET ORAL at 10:16

## 2018-04-16 RX ADMIN — TRAZODONE HYDROCHLORIDE SCH MG: 100 TABLET ORAL at 21:31

## 2018-04-16 RX ADMIN — FAMOTIDINE SCH MG: 20 TABLET ORAL at 21:31

## 2018-04-16 RX ADMIN — Medication SCH: at 21:37

## 2018-04-16 RX ADMIN — Medication SCH: at 10:17

## 2018-04-16 NOTE — PROGRESS NOTE
Subjective





- Reason for Consult


Consult date: 04/16/18


Reason for consult: Psychiatry Follow-up





- Chief Complaint


Chief complaint: 


"I was at [facility]"





48-year-old female brought in by EMS with chief complaint of altered mental 

status. Psychiatry was consulted for medication management. Today the patient 

is calm and cooperative during the assessment. She could not remember what 

happened at [facility] when asked per the ER note. She was lucid during the 

interview and stated that she lives with her sister Arlen. She stated that 

she has a psychiatrist (outpatient). She stated that she takes Zyprexa and 

Trazodone. She denies SI/HI's and AVH's. Per the notes, no behavioral 

disturbance overnight. The patient acknowledged eating all her meals and 

completing her ADL's.  





Mental Status Exam





- Vital signs


 Last Vital Signs











Temp  98.3 F   04/15/18 17:10


 


Pulse  104 H  04/14/18 22:30


 


Resp  20   04/15/18 17:10


 


BP  121/70   04/15/18 17:10


 


Pulse Ox  96   04/14/18 22:30














- Exam


Narrative exam: 


MSE:





 Appearance: calm, cooperative  


 Behavior: regular eye contact 


 Speech: regular rate and tone


 Mood: "okay"


 Affect: congruent to mood


 Thought Process: circumstantial   


 Thought Content: denies SI/HI's and AVH's


 Motor Activity: sitting up in bed 


 Cognition: A/O X2 


 Insight: variable 


 Judgment: fair 








Assessment and Plan


Impression: Hx of Schizophrenia. Today the patient is calm and cooperative 

during the assessment. 





Recommendation/Plan: Continue Trazodone 50 mg PO HS (home medication), Haldol 5 

mg Im Q6hrs PRN for acute agitation, and hold Zyprexa until CK results. Called 

her sister Arlen Gross  for collateral information, no answer, 

but left a voicemail. Ordered STAT CK.

## 2018-04-16 NOTE — PROGRESS NOTE
Assessment and Plan


Assessment and plan: 


Acute psychosis in patient with known schizophrenia. 


Continue Olanzapine and Trazodone she was using at home.


Psych consulted





Rhabdomyolysis. Continue iv fluids.


Check Creatine kinase daily. Labs for today pending. CK 1280 yesterday.





DVT prophylaxis with Lovenox





Full code status





Discussed with sister at bedside few days ago. Sister states Pt has 

schizophrenia and has been off medications for a while.








History


Interval history: 


Patient with history of schizophrenia,


still confused,disoriented,


no fever








Hospitalist Physical





- Physical exam


Narrative exam: 


Gen appearance: Not in acute distress, lying down in bed, 


HEENT:Normocephalic, atraumatic


Neck:supple, no JVD


Lungs: Clear to auscultation bilaterally, no crackles , no wheeze


Heart: S1 and S2 regular, no murmurs, rubs or gallop


Abdomen: soft, non tender, non distended, normal bowel sounds


Ext: No edema, no clubbing, no cyanosis.


Neuro: Awake, alert, oriented to person, but not to place or time, Moves all ext

, no focal signs,confused


Psych:has schizophrenia














- Constitutional


Vitals: 


 











Temp Pulse Resp BP Pulse Ox


 


 98.3 F   104 H  20   121/70   96 


 


 04/15/18 17:10  04/14/18 22:30  04/15/18 17:10  04/15/18 17:10  04/14/18 22:30











General appearance: Present: no acute distress, well-nourished





Results





- Labs


CBC & Chem 7: 


 04/14/18 08:08





 04/14/18 08:08


Labs: 


 Laboratory Last Values











WBC  5.5 K/mm3 (4.5-11.0)   04/14/18  08:08    


 


RBC  4.04 M/mm3 (3.65-5.03)   04/14/18  08:08    


 


Hgb  13.0 gm/dl (10.1-14.3)   04/14/18  08:08    


 


Hct  38.9 % (30.3-42.9)   04/14/18  08:08    


 


MCV  96 fl (79-97)   04/14/18  08:08    


 


MCH  32 pg (28-32)   04/14/18  08:08    


 


MCHC  33 % (30-34)   04/14/18  08:08    


 


RDW  15.3 % (13.2-15.2)  H  04/14/18  08:08    


 


Plt Count  261 K/mm3 (140-440)   04/14/18  08:08    


 


Lymph % (Auto)  32.0 % (13.4-35.0)   04/14/18  08:08    


 


Mono % (Auto)  7.0 % (0.0-7.3)   04/14/18  08:08    


 


Eos % (Auto)  4.4 % (0.0-4.3)  H  04/14/18  08:08    


 


Baso % (Auto)  1.2 % (0.0-1.8)   04/14/18  08:08    


 


Lymph #  1.8 K/mm3 (1.2-5.4)   04/14/18  08:08    


 


Mono #  0.4 K/mm3 (0.0-0.8)   04/14/18  08:08    


 


Eos #  0.2 K/mm3 (0.0-0.4)   04/14/18  08:08    


 


Baso #  0.1 K/mm3 (0.0-0.1)   04/14/18  08:08    


 


Seg Neutrophils %  55.4 % (40.0-70.0)   04/14/18  08:08    


 


Seg Neutrophils #  3.1 K/mm3 (1.8-7.7)   04/14/18  08:08    


 


PT  13.3 Sec. (12.2-14.9)   04/13/18  12:26    


 


INR  0.96  (0.87-1.13)   04/13/18  12:26    


 


APTT  33.2 Sec. (24.2-36.6)   04/13/18  12:26    


 


Sodium  137 mmol/L (137-145)   04/14/18  08:08    


 


Potassium  3.8 mmol/L (3.6-5.0)   04/14/18  08:08    


 


Chloride  101.6 mmol/L ()   04/14/18  08:08    


 


Carbon Dioxide  24 mmol/L (22-30)   04/14/18  08:08    


 


Anion Gap  15 mmol/L  04/14/18  08:08    


 


BUN  7 mg/dL (7-17)   04/14/18  08:08    


 


Creatinine  0.5 mg/dL (0.7-1.2)  L  04/14/18  08:08    


 


Estimated GFR  > 60 ml/min  04/14/18  08:08    


 


BUN/Creatinine Ratio  14 %  04/14/18  08:08    


 


Glucose  99 mg/dL ()   04/14/18  08:08    


 


Hemoglobin A1c  4.9 % (4-6)   04/14/18  08:08    


 


Calcium  8.8 mg/dL (8.4-10.2)   04/14/18  08:08    


 


Total Bilirubin  0.40 mg/dL (0.1-1.2)   04/14/18  08:08    


 


AST  36 units/L (5-40)   04/14/18  08:08    


 


ALT  15 units/L (7-56)   04/14/18  08:08    


 


Alkaline Phosphatase  57 units/L ()   04/14/18  08:08    


 


Ammonia  48.0 umol/L (25-60)   04/13/18  12:26    


 


Total Creatine Kinase  1280 units/L ()  H  04/15/18  16:43    


 


CK-MB (CK-2)  23.5 ng/mL (0.0-4.0)  H  04/13/18  12:26    


 


CK-MB (CK-2) Rel Index  0.9  (0-4)   04/13/18  12:26    


 


Troponin T  < 0.010 ng/mL (0.00-0.029)   04/13/18  12:26    


 


NT-Pro-B Natriuret Pep  31.77 pg/mL (0-900)   04/13/18  12:26    


 


Total Protein  6.4 g/dL (6.3-8.2)   04/14/18  08:08    


 


Albumin  3.4 g/dL (3.9-5)  L  04/14/18  08:08    


 


Albumin/Globulin Ratio  1.1 %  04/14/18  08:08    


 


TSH  0.700 mlU/mL (0.270-4.200)   04/13/18  12:26    


 


Free T4  1.15 ng/dL (0.76-1.46)   04/13/18  12:26    


 


Urine Color  Yellow  (Yellow)   04/13/18  Unknown


 


Urine Turbidity  Clear  (Clear)   04/13/18  Unknown


 


Urine pH  5.0  (5.0-7.0)   04/13/18  Unknown


 


Ur Specific Gravity  1.010  (1.003-1.030)   04/13/18  Unknown


 


Urine Protein  <15 mg/dl mg/dL (Negative)   04/13/18  Unknown


 


Urine Glucose (UA)  Neg mg/dL (Negative)   04/13/18  Unknown


 


Urine Ketones  Neg mg/dL (Negative)   04/13/18  Unknown


 


Urine Blood  Sm  (Negative)   04/13/18  Unknown


 


Urine Nitrite  Neg  (Negative)   04/13/18  Unknown


 


Urine Bilirubin  Neg  (Negative)   04/13/18  Unknown


 


Urine Urobilinogen  < 2.0 mg/dL (<2.0)   04/13/18  Unknown


 


Ur Leukocyte Esterase  Neg  (Negative)   04/13/18  Unknown


 


Urine WBC (Auto)  3.0 /HPF (0.0-6.0)   04/13/18  Unknown


 


Urine RBC (Auto)  2.0 /HPF (0.0-6.0)   04/13/18  Unknown


 


Uric Acid Crystals  1+   04/13/18  Unknown


 


Urine Mucus  Few /HPF  04/13/18  Unknown


 


Urine Opiates Screen  Presumptive negative   04/13/18  Unknown


 


Urine Methadone Screen  Presumptive negative   04/13/18  Unknown


 


Ur Barbiturates Screen  Presumptive negative   04/13/18  Unknown


 


Ur Phencyclidine Scrn  Presumptive negative   04/13/18  Unknown


 


Ur Amphetamines Screen  Presumptive negative   04/13/18  Unknown


 


U Benzodiazepines Scrn  Presumptive negative   04/13/18  Unknown


 


Urine Cocaine Screen  Presumptive negative   04/13/18  Unknown


 


U Marijuana (THC) Screen  Presumptive negative   04/13/18  Unknown


 


Drugs of Abuse Note  Disclamer   04/13/18  Unknown


 


Plasma/Serum Alcohol  < 0.01 % (0-0.07)   04/13/18  12:26

## 2018-04-17 VITALS — DIASTOLIC BLOOD PRESSURE: 67 MMHG | SYSTOLIC BLOOD PRESSURE: 117 MMHG

## 2018-04-17 RX ADMIN — Medication SCH: at 09:35

## 2018-04-17 RX ADMIN — FAMOTIDINE SCH MG: 20 TABLET ORAL at 09:42

## 2018-04-17 NOTE — DISCHARGE SUMMARY
Providers





- Providers


Date of Admission: 


04/13/18 13:48





Date of discharge: 04/17/18


Attending physician: 


JAY ALEX





 





04/13/18


Consult to Case Management [CONS] Routine 


   Services Needed at Discharge: 


   Notified:: COPY LEFT FOR CM





04/13/18 17:21


Consult to Mental Health [CONS] Routine 


   Reason For Exam: evaluate mental status


   Place consult to:: mental health


   Notified:: 4048





04/16/18 12:49


Consult to Mental Health [CONS] Urgent 


   Reason For Exam: Evaluate mental status


   Place consult to:: Mental Health


   Notified:: Yes


   Time called:: 12:50











Primary care physician: 


SARAH HOFFMAN








Hospitalization


Condition: Fair


Pertinent studies: 





CT head: No acute intracranial abnormality


Hospital course: 


The patient is a 48-year-old female brought in by EMS with chief complaint of 

altered mental status.  Per documentation from ER doc EMS was called to the 

[facility] for altered mental status.  In the ED when asked why she was 

brought to the emergency department the patient replied "I don't like meat."  

And "I've been standing still for a million years."  Per EMS the patient was 

found to have an Accu-Chek in the 50s which was treated by juice.  Her CT head 

was unremarkable and did not show any acute intracranial process.  She was 

admitted for further evaluation and management.  Further discussion with the 

sister revealed that patient has history of schizophrenia.  Psych was consulted 

and she was placed back on trazodone.  Her CPK level was elevated up to 2482 

which was treated with IV fluids.  Zyprexa was on hold for elevated CPK level 

per psych recommendation.  Patient's mental status improved, did not have any 

further hypoglycemic events.  She will follow up with outpatient psychiatrist.  

She was discharged home with her sister in stable condition.





Discharge diagnosis:


Altered mental status, likely from acute psychosis and hypoglycemia 


Hypoglycemic event, resolved


Acute psychosis with known schizophrenia, now at baseline


Rhabdomyolysis, Improved CPK level with IV fluid


DVT prophylaxis with Lovenox





Hospitalist Physical 


Gen appearance: Not in acute distress, lying down in bed, 


HEENT:Normocephalic, atraumatic


Neck:supple, no JVD


Lungs: Clear to auscultation bilaterally, no crackles , no wheeze


Heart: S1 and S2 regular, no murmurs, rubs or gallop


Abdomen: soft, non tender, non distended, normal bowel sounds


Ext: No edema, no clubbing, no cyanosis.


Neuro: Awake, alert, oriented to person, but not to place or time, Moves all ext

, no focal signs,


Psych: Cooperative, not agitated








Disposition: DC-01 TO HOME OR SELFCARE


Time spent for discharge: 32 minutes





Core Measure Documentation





- Palliative Care


Palliative Care/ Comfort Measures: Not Applicable





- Core Measures


Any of the following diagnoses?: none





Exam





- Constitutional


Vitals: 


 











Temp Pulse Resp BP Pulse Ox


 


 98.1 F   81   18   101/61   100 


 


 04/17/18 07:25  04/17/18 07:25  04/17/18 07:25  04/17/18 07:25  04/17/18 00:51














Plan


Activity: advance as tolerated


Weight Bearing Status: Weight Bear as Tolerated


Diet: regular, other (keep yourself hydrated and drink plain water)


Follow up with: 


SARAH HOFFMAN MD [Primary Care Provider] - 3-5 Days

## 2018-04-17 NOTE — PROGRESS NOTE
Subjective





- Reason for Consult


Consult date: 04/17/18


Reason for consult: Psychiatry Follow-up





- Chief Complaint


Chief complaint: 


"How are you"





48-year-old female brought in by EMS with chief complaint of altered mental 

status. Psychiatry was consulted for medication management. Today the patient 

is calm and cooperative during the assessment. She denies SI/HI's and AVH's. 

Per collateral information from her sister Arlen Gross at , she 

stated that her sister has a hx of schizophrenia. She stated that she receives 

the monthly Invega injection from [facility] in [place]. Per the notes

, no behavioral disturbance overnight. Per the staff, the patient is eating her 

meals and completing her ADL's. 





Mental Status Exam





- Vital signs


 Last Vital Signs











Temp  98.1 F   04/17/18 07:25


 


Pulse  81   04/17/18 07:25


 


Resp  18   04/17/18 07:25


 


BP  101/61   04/17/18 07:25


 


Pulse Ox  100   04/17/18 00:51














- Exam


Narrative exam: 


MSE:





 Appearance: calm, cooperative  


 Behavior: regular eye contact 


 Speech: regular rate and tone


 Mood: "okay"


 Affect: congruent to mood


 Thought Process: circumstantial   


 Thought Content: denies SI/HI's and AVH's


 Motor Activity: ambulatory


 Cognition: A/O X3 


 Insight: fair  


 Judgment: fair 




















Assessment and Plan


Impression: Hx of Schizophrenia. Today the patient is calm and cooperative 

during the assessment. .





Recommendation/Plan: Continue Trazodone 100 mg PO HS (home medication). 

Continue to hold Zyprexa, the patient receives the monthly Invega injection. 

Last injection 4/11/2018 from [facility] [place]. Discussed 

possible suicidality/medication induced jaison with patient reference Trazodone. 

The patient can follow up with [facility] for outpatient psy services.

## 2018-04-18 NOTE — PROGRESS NOTE
Subjective





- Reason for Consult


Consult date: 04/18/18


Reason for consult: Psychiatric Follow-up Evaluation





- Chief Complaint


Chief complaint: 


 " I haven't been taking medication." 





Daija is a 48-year-old female brought in by EMS with chief complaint of altered 

mental status. Psychiatry was consulted for medication management. Today the 

patient is calm and cooperative during the assessment. She denies SI/HI's and 

AVH's.








Mental Status Exam





- Vital signs


 Last Vital Signs











Temp  98.3 F   04/17/18 15:48


 


Pulse  89   04/17/18 15:48


 


Resp  15   04/17/18 15:48


 


BP  117/67   04/17/18 15:48


 


Pulse Ox  99   04/17/18 15:48














- Exam


Narrative exam: 


Mental Status Exam:





 Appearance: calm, cooperative  


 Behavior: regular eye contact 


 Speech: regular rate and tone


 Mood: "okay"


 Affect: congruent to mood


 Thought Process: circumstantial   


 Thought Content: denies SI/HI's and AVH's


 Motor Activity: ambulatory


 Cognition: A/O X3 


 Insight: fair  


 Judgment: fair 











Assessment and Plan


Impression: Hx of Schizophrenia. Today the patient is calm and cooperative 

during the assessment. She endorses VH. Paranoid thoughts are evident. Loose 

associations are noted. CK level is elevated, however, trending downward (1280)

. 





DDx: Schizophrenia 





Recommendation/Plan: 


1. Continue Trazodone 50 mg PO HS (home medication), Haldol 5 mg Im Q6hrs PRN 

for acute agitation.


2. Monitor CK level.


3. Will consider discontinuing/holding antipsychotics if CK levels increase. 


4. Will attempt to contact sister, Arlen Gross  for collateral 

information.